# Patient Record
Sex: FEMALE | Race: WHITE | Employment: OTHER | URBAN - METROPOLITAN AREA
[De-identification: names, ages, dates, MRNs, and addresses within clinical notes are randomized per-mention and may not be internally consistent; named-entity substitution may affect disease eponyms.]

---

## 2023-10-12 RX ORDER — HYDROXYZINE HYDROCHLORIDE 10 MG/1
2 TABLET, FILM COATED ORAL EVERY 6 HOURS PRN
COMMUNITY
Start: 2023-08-19

## 2023-10-12 RX ORDER — GABAPENTIN 100 MG/1
100 CAPSULE ORAL NIGHTLY
COMMUNITY
Start: 2023-10-04

## 2023-10-12 RX ORDER — ATORVASTATIN CALCIUM 80 MG/1
80 TABLET, FILM COATED ORAL NIGHTLY
COMMUNITY
Start: 2023-09-29

## 2023-10-12 RX ORDER — LISINOPRIL 10 MG/1
10 TABLET ORAL DAILY
COMMUNITY
Start: 2023-09-29

## 2023-10-12 RX ORDER — MIRABEGRON 25 MG/1
25 TABLET, FILM COATED, EXTENDED RELEASE ORAL DAILY
COMMUNITY
Start: 2022-11-30 | End: 2023-10-18 | Stop reason: ALTCHOICE

## 2023-10-12 RX ORDER — ESCITALOPRAM OXALATE 10 MG/1
10 TABLET ORAL DAILY
COMMUNITY
Start: 2023-08-13

## 2023-10-12 RX ORDER — LOSARTAN POTASSIUM 100 MG/1
100 TABLET ORAL DAILY
COMMUNITY
Start: 2022-12-27 | End: 2023-10-18 | Stop reason: ALTCHOICE

## 2023-10-12 RX ORDER — PANTOPRAZOLE SODIUM 40 MG/1
40 TABLET, DELAYED RELEASE ORAL DAILY
COMMUNITY
Start: 2023-09-29

## 2023-10-12 RX ORDER — CARVEDILOL 3.12 MG/1
3.12 TABLET ORAL
COMMUNITY
Start: 2022-12-27 | End: 2023-10-18 | Stop reason: ALTCHOICE

## 2023-10-12 RX ORDER — CHLORTHALIDONE 25 MG/1
25 TABLET ORAL DAILY
COMMUNITY
Start: 2023-01-16 | End: 2023-10-18 | Stop reason: ALTCHOICE

## 2023-10-12 RX ORDER — BUPROPION HYDROCHLORIDE 300 MG/1
300 TABLET ORAL
COMMUNITY
Start: 2023-08-11

## 2023-10-12 RX ORDER — TACROLIMUS 1 MG/G
OINTMENT TOPICAL 2 TIMES DAILY
COMMUNITY
Start: 2022-10-31 | End: 2023-10-18 | Stop reason: ALTCHOICE

## 2023-10-12 RX ORDER — BUPROPION HYDROCHLORIDE 150 MG/1
150 TABLET ORAL
COMMUNITY
Start: 2023-01-16 | End: 2023-10-18 | Stop reason: ALTCHOICE

## 2023-10-13 ENCOUNTER — OFFICE VISIT (OUTPATIENT)
Dept: ORTHOPEDIC SURGERY | Facility: CLINIC | Age: 83
End: 2023-10-13
Payer: MEDICARE

## 2023-10-13 VITALS — WEIGHT: 129 LBS | HEIGHT: 59 IN | BODY MASS INDEX: 26 KG/M2

## 2023-10-13 DIAGNOSIS — G95.20 CERVICAL SPINAL CORD COMPRESSION (MULTI): ICD-10-CM

## 2023-10-13 DIAGNOSIS — M48.02 CERVICAL SPINAL STENOSIS: ICD-10-CM

## 2023-10-13 DIAGNOSIS — G95.9 CERVICAL MYELOPATHY WITH CERVICAL RADICULOPATHY (MULTI): Primary | ICD-10-CM

## 2023-10-13 DIAGNOSIS — M54.12 CERVICAL MYELOPATHY WITH CERVICAL RADICULOPATHY (MULTI): Primary | ICD-10-CM

## 2023-10-13 PROCEDURE — 1036F TOBACCO NON-USER: CPT | Performed by: ORTHOPAEDIC SURGERY

## 2023-10-13 PROCEDURE — 99203 OFFICE O/P NEW LOW 30 MIN: CPT | Performed by: ORTHOPAEDIC SURGERY

## 2023-10-13 NOTE — PROGRESS NOTES
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  She comes in today with difficulty with walking and balance which preceded her stroke.  She denies any upper extremity symptoms.  She is currently on aspirin as a preventative for her stroke.  She is using a walker for ambulation.      ROS:  Reviewed on EMR and patient intake sheet.    PMH/SH:   Reviewed on EMR and patient intake sheet.    Exam:  Physical Exam    Constitutional: Well appearing; no acute distress  Eyes: pupils are equal and round  Psych: normal affect  Respiratory: non-labored breathing  Cardiovascular: regular rate and rhythm  GI: non-distended abdomen  Musculoskeletal: no pain with range of motion of the shoulders bilaterally; no signs of impingement  Neurologic: [4-]/5 strength in the upper extremities bilaterally]; [negative Marcos's]; [no hyper-reflexia]; negative Spurling's    Radiology:  MRI was personally reviewed.  It demonstrates severe multilevel cervical stenosis and spinal cord compression from C2-C7.  Evidence of slight C1-2 pannus consistent with rheumatoid disease.    Diagnosis:  Cervical spondylotic myelopathy; cervical stenosis; cervical spinal cord compression    Assessment and Plan:   83-year-old woman with symptomatic cervical myelopathy secondary to multilevel cervical stenosis.  She has had a superimposed left hemiplegia from her recent stroke.  Although her myelopathy will be progressive if she does not have surgical intervention, at this time she is recovering from her recent stroke, surgical intervention would be too high risk from an anesthetic standpoint.  I recommend that she continue to rehab from her stroke at this time.  However, should she have progressive myelopathy, and should she be cleared for surgical intervention, then surgery would be the best way to prevent further worsening of her underlying neurologic status.   She will continue rehab and follow-up with me, based on progression of symptoms.    The patient was in agreement with the plan. At the end of the visit today, the patient felt that all questions had been answered satisfactorily.  The patient was pleased with the visit and very appreciative for the care rendered.     Thank you very much for the kind referral.  It is a privilege, and a pleasure, to partner with you in the care of your patients.  I would be delighted to assist you with any further consultations as needed.  Please feel free to have your patients refer to my website, [email or web address], for patient education materials regarding treatment options for common spinal conditions.        Sudheer Hawkins MD    Chief of Spine Surgery, [facility]  Director of Spine Service, [facility]  , Department of Orthopaedics  [facility] [employer]  [address] [place] [address]  [email or web address]  P: [phone]    This note was dictated with voice recognition software.  It has not been proofread for grammatical errors, typographical mistakes or other semantic inconsistencies.

## 2023-10-18 ENCOUNTER — OFFICE VISIT (OUTPATIENT)
Dept: NEUROLOGY | Facility: HOSPITAL | Age: 83
End: 2023-10-18
Payer: MEDICARE

## 2023-10-18 VITALS
WEIGHT: 141.5 LBS | HEIGHT: 59 IN | HEART RATE: 69 BPM | DIASTOLIC BLOOD PRESSURE: 65 MMHG | BODY MASS INDEX: 28.52 KG/M2 | SYSTOLIC BLOOD PRESSURE: 116 MMHG

## 2023-10-18 DIAGNOSIS — I63.511 CEREBROVASCULAR ACCIDENT (CVA) DUE TO STENOSIS OF RIGHT MIDDLE CEREBRAL ARTERY (MULTI): Primary | ICD-10-CM

## 2023-10-18 DIAGNOSIS — I67.9 CEREBROVASCULAR DISEASE: ICD-10-CM

## 2023-10-18 PROBLEM — M25.431 SWELLING OF JOINT OF RIGHT WRIST: Status: ACTIVE | Noted: 2022-04-03

## 2023-10-18 PROBLEM — R06.02 SHORTNESS OF BREATH: Status: ACTIVE | Noted: 2022-10-05

## 2023-10-18 PROBLEM — M79.605 BILATERAL LEG PAIN: Status: ACTIVE | Noted: 2019-11-19

## 2023-10-18 PROBLEM — M54.2 CERVICALGIA: Status: ACTIVE | Noted: 2023-10-18

## 2023-10-18 PROBLEM — R55 SYNCOPE AND COLLAPSE: Status: ACTIVE | Noted: 2022-10-02

## 2023-10-18 PROBLEM — M53.3 DISORDER OF SACRUM: Status: ACTIVE | Noted: 2023-10-18

## 2023-10-18 PROBLEM — R43.2 ALTERED TASTE: Status: ACTIVE | Noted: 2019-11-19

## 2023-10-18 PROBLEM — F51.01 PRIMARY INSOMNIA: Status: ACTIVE | Noted: 2023-09-08

## 2023-10-18 PROBLEM — R68.2 DRY MOUTH: Status: ACTIVE | Noted: 2022-04-03

## 2023-10-18 PROBLEM — N18.4 STAGE 4 CHRONIC KIDNEY DISEASE (MULTI): Status: ACTIVE | Noted: 2020-09-23

## 2023-10-18 PROBLEM — G89.29 CHRONIC PAIN: Status: ACTIVE | Noted: 2023-10-18

## 2023-10-18 PROBLEM — N28.89 MASS OF KIDNEY OF UNKNOWN NATURE: Status: ACTIVE | Noted: 2019-11-10

## 2023-10-18 PROBLEM — R09.82 POST-NASAL DRAINAGE: Status: ACTIVE | Noted: 2022-04-03

## 2023-10-18 PROBLEM — M79.604 BILATERAL LEG PAIN: Status: ACTIVE | Noted: 2019-11-19

## 2023-10-18 PROBLEM — F33.1 MODERATE EPISODE OF RECURRENT MAJOR DEPRESSIVE DISORDER (MULTI): Status: ACTIVE | Noted: 2019-02-18

## 2023-10-18 PROBLEM — G47.33 OSA (OBSTRUCTIVE SLEEP APNEA): Chronic | Status: ACTIVE | Noted: 2018-07-20

## 2023-10-18 PROBLEM — R00.1 BRADYCARDIA: Status: ACTIVE | Noted: 2022-10-01

## 2023-10-18 PROBLEM — M54.50 LOW BACK PAIN: Status: ACTIVE | Noted: 2022-04-03

## 2023-10-18 PROBLEM — R11.10 REGURGITATION OF FOOD: Status: ACTIVE | Noted: 2022-12-20

## 2023-10-18 PROBLEM — K21.9 GASTROESOPHAGEAL REFLUX DISEASE: Status: ACTIVE | Noted: 2022-12-20

## 2023-10-18 PROBLEM — E83.42 HYPOMAGNESEMIA: Status: ACTIVE | Noted: 2022-10-02

## 2023-10-18 PROBLEM — N17.9 AKI (ACUTE KIDNEY INJURY) (CMS-HCC): Status: ACTIVE | Noted: 2019-11-18

## 2023-10-18 PROBLEM — N39.41 URGE INCONTINENCE OF URINE: Status: ACTIVE | Noted: 2021-09-28

## 2023-10-18 PROCEDURE — 3074F SYST BP LT 130 MM HG: CPT | Performed by: NURSE PRACTITIONER

## 2023-10-18 PROCEDURE — 1159F MED LIST DOCD IN RCRD: CPT | Performed by: NURSE PRACTITIONER

## 2023-10-18 PROCEDURE — 99417 PROLNG OP E/M EACH 15 MIN: CPT | Performed by: NURSE PRACTITIONER

## 2023-10-18 PROCEDURE — 1036F TOBACCO NON-USER: CPT | Performed by: NURSE PRACTITIONER

## 2023-10-18 PROCEDURE — 99215 OFFICE O/P EST HI 40 MIN: CPT | Performed by: NURSE PRACTITIONER

## 2023-10-18 PROCEDURE — 1160F RVW MEDS BY RX/DR IN RCRD: CPT | Performed by: NURSE PRACTITIONER

## 2023-10-18 PROCEDURE — 3078F DIAST BP <80 MM HG: CPT | Performed by: NURSE PRACTITIONER

## 2023-10-18 RX ORDER — METHOCARBAMOL 750 MG/1
750 TABLET, FILM COATED ORAL
COMMUNITY
Start: 2019-10-07 | End: 2023-10-18 | Stop reason: ALTCHOICE

## 2023-10-18 RX ORDER — PNV NO.95/FERROUS FUM/FOLIC AC 28MG-0.8MG
100 TABLET ORAL
COMMUNITY

## 2023-10-18 RX ORDER — MAGNESIUM CHLORIDE 64 MG
64 TABLET, DELAYED RELEASE (ENTERIC COATED) ORAL
COMMUNITY
Start: 2019-11-11 | End: 2023-10-18 | Stop reason: ALTCHOICE

## 2023-10-18 RX ORDER — ALENDRONATE SODIUM 70 MG/1
1 TABLET ORAL
COMMUNITY
Start: 2022-06-24

## 2023-10-18 RX ORDER — AMOXICILLIN 250 MG
2 CAPSULE ORAL DAILY
COMMUNITY

## 2023-10-18 RX ORDER — ASPIRIN 81 MG/1
81 TABLET ORAL DAILY
COMMUNITY

## 2023-10-18 RX ORDER — ACETAMINOPHEN 500 MG
1000 TABLET ORAL
COMMUNITY
Start: 2020-11-25

## 2023-10-18 RX ORDER — OXYCODONE AND ACETAMINOPHEN 5; 325 MG/1; MG/1
1 TABLET ORAL EVERY 6 HOURS PRN
COMMUNITY

## 2023-10-18 RX ORDER — LORATADINE 10 MG/1
1 TABLET ORAL
COMMUNITY
Start: 2017-06-26

## 2023-10-18 RX ORDER — VIT C/E/ZN/COPPR/LUTEIN/ZEAXAN 250MG-90MG
5000 CAPSULE ORAL
COMMUNITY
End: 2023-10-18 | Stop reason: ALTCHOICE

## 2023-10-18 RX ORDER — AMLODIPINE BESYLATE 10 MG/1
TABLET ORAL EVERY 24 HOURS
COMMUNITY
End: 2023-10-18 | Stop reason: ALTCHOICE

## 2023-10-18 ASSESSMENT — ENCOUNTER SYMPTOMS
OCCASIONAL FEELINGS OF UNSTEADINESS: 1
DEPRESSION: 1
LOSS OF SENSATION IN FEET: 0

## 2023-10-18 ASSESSMENT — PATIENT HEALTH QUESTIONNAIRE - PHQ9
SUM OF ALL RESPONSES TO PHQ9 QUESTIONS 1 AND 2: 3
SUM OF ALL RESPONSES TO PHQ QUESTIONS 1-9: 9
10. IF YOU CHECKED OFF ANY PROBLEMS, HOW DIFFICULT HAVE THESE PROBLEMS MADE IT FOR YOU TO DO YOUR WORK, TAKE CARE OF THINGS AT HOME, OR GET ALONG WITH OTHER PEOPLE: VERY DIFFICULT
1. LITTLE INTEREST OR PLEASURE IN DOING THINGS: NOT AT ALL
7. TROUBLE CONCENTRATING ON THINGS, SUCH AS READING THE NEWSPAPER OR WATCHING TELEVISION: NOT AT ALL
9. THOUGHTS THAT YOU WOULD BE BETTER OFF DEAD, OR OF HURTING YOURSELF: NOT AT ALL
2. FEELING DOWN, DEPRESSED OR HOPELESS: NEARLY EVERY DAY
3. TROUBLE FALLING OR STAYING ASLEEP OR SLEEPING TOO MUCH: SEVERAL DAYS
8. MOVING OR SPEAKING SO SLOWLY THAT OTHER PEOPLE COULD HAVE NOTICED. OR THE OPPOSITE, BEING SO FIGETY OR RESTLESS THAT YOU HAVE BEEN MOVING AROUND A LOT MORE THAN USUAL: NOT AT ALL
4. FEELING TIRED OR HAVING LITTLE ENERGY: SEVERAL DAYS
5. POOR APPETITE OR OVEREATING: SEVERAL DAYS
6. FEELING BAD ABOUT YOURSELF - OR THAT YOU ARE A FAILURE OR HAVE LET YOURSELF OR YOUR FAMILY DOWN: NEARLY EVERY DAY

## 2023-10-18 ASSESSMENT — VISUAL ACUITY: VA_NORMAL: 1

## 2023-10-18 NOTE — PATIENT INSTRUCTIONS
"1. Continue your daily Asprin and statin medication.   Asprin is not a blood thinner but rather a platelet inhibitor medication. Platelets are the component in your blood that causes it to clot. When a person has a stroke, platelets clump together and form clots inside of narrowed arteries, blocking blood flow in the brain. Antiplatelet therapy helps prevent new clots from developing and is beneficial for the treatment of acute ischemic stroke, they do NOT dissolve clots that are already present    2. You have had a stroke. Strokes can happen when either: an artery going to the brain gets clogged or closes off, and part of the brain goes without blood for too long (ischemic stroke); or, an artery breaks open and starts bleeding into or around the brain (hemorrhagic stroke). During recovery, people work to regain some of the abilities they lost. Even though a part of their brain was damaged by the stroke, their brain might be able re-learn how to do some or all of the things it used to do. In general, the majority of a person's recovery happens in the first three to six months after the stroke; after this, improvements in physical and mental function can still happen, but progress tends to slow down. After about 1.5-2 years, what symptoms that are left over are likely your new normal.    TIA stands for \"transient ischemic attack.\" This is what people call a mini-stroke. It is like a stroke, but does not cause permanent damage to the brain. TIAs happen when an artery in the brain gets clogged or closes off, then reopens on its own. This can happen if a blood clot forms and then moves away or dissolves.Even though TIAs do not cause lasting symptoms, they are serious. If you have a TIA, you are at high risk of having a stroke. It's important to see a doctor and take steps to prevent that from happening. Do not ignore the symptoms of a stroke even if they go away!    3.The Acronym \"BE FAST\" can help you remember stroke " symptoms to watch out for:  Balance - Is the person having trouble standing or walking?  Eyes - Is the person having trouble with their vision?  Face - Does the person's face look uneven or droop on 1 side?  Arm - Does the person have weakness or numbness in 1 or both arms? Does 1 arm drift down if they try to hold both arms out?  Speech - Is the person having trouble speaking? Does their speech sound strange?  Time - If you notice any of these stroke signs, call for an ambulance (call [id number]). You need to act FAST. The sooner treatment begins, the better the chances of recovery.    A stroke caused by bleeding in the brain can also cause a sudden, severe headache.    4. It is important to control your stroke risk factors. These risk factors can increase the risk of stroke but strokes can still happen in people who do not know they are at risk or do not have risk factors.     Ischemic stroke risk factors include the following: age older than 40 years, heart disease, high blood pressure, smoking, diabetes, high cholesterol, illegal drug use, recent childbirth, previous TIA history, lack of exercise, obesity, current or past history of blood clots, and family history of heart disease and/or stroke.     Hemorrhagic stroke risk factors include the following: high blood pressure, smoking, illegal drug use, and use of blood thinners.       FU with your eye doctor as planned.  Keep doing your therapies.     FU here in 6 months or sooner if needed.

## 2023-10-18 NOTE — PROGRESS NOTES
SUBJECTIVE    Myrna Francis is a 83 y.o. right-handed female who presents with   Chief Complaint   Patient presents with    Stroke    Patient is accompanied by: spouse.    Presents for follow up visit  Visit type: In-Person     HISTORY OF PRESENT ILLNESS    RECAP:  Hospital follow up for stroke. Pt was admitted to [facility] 9/13-9/18/23. Seen by Dr. Mckeon and myself inpatient.     PMH: HTN and MCI    Presented for AMS and facial weakness. LKW was 9/12/23 evening prior to going to bed. In the morning, her  found her struggling to get out of the bed. She had left side facial weakness and trouble speaking.  Patient has chronic weakness of her left upper extremity secondary to shoulder joint problem.  Patient had fallen about 10 days prior and was complaining of L rib pain.     HCT without acute findings.   Total cholesterol 203, , triglyceride 168  A1C 5.8%  TTE: EF normal, LA mild to moderately dilated.   MRI brain showed acute to early subacute ischemic injury in R MCA distribution. Also showed extensive small vessel disease and likely remote lacunar infarcts.   MRA head and neck showed marked irregularity and narrowing of R MCA as well as narrowing of bilateral ACAs, L MCA and bilateral PCAs suggestive of multifocal stenoses and vasculopathy. R V4 segment also with narrowing and artifact vs nonocclusive filling defect vs thrombus at the vertebrobasilar junction. Additionally, it showed 2 mm outpouchings projecting inferiorly from the communicating segments of the ICAs. Bilateral proximal ICAs and carotid bifurcations with mild stenosis.   MRI c-spine with moderate spinal canal stenosis and some effacement, mainly level C3-C4     Started on DAPT and signed off.     Neurology was then reconsulted for anisocoria/sluggish R pupil.  CT head suggests new lacunar stroke in thalamus. Pt and  denied any changes to symptoms. On routine neuro check, nurse noted anisocoria with L pupil 6 mm and R pupil 4  mm, prompting obtaining stat CT scan. There was no acute thalamic stroke on repeat MRI.  CTA head then done to further evaluate MRA findings, showed R M1 and L P2 stenosis, no basilar artery thrombus seen on CTA; bilateral infundibulum of PCom with no aneurysm; no significant extracranial stenosis of MELVA, LICA.     10/18/23 -     She went to [facility] x 10 days. States they were very happy with the care that they received there. Has since been getting home PT but is going back to the outpatient PT next week because they feel the other equipment was helpful. Is currently ambulating with walker. States balance and mobility is improving slowly.     SLP signed off while in the hospital. Pt and  deny any further speech difficulties.      states facial symmetry is at baseline level.   Only complaint is difficulty walking, LLE weakness issues and neck pain/spasms. Has since seen Dr. Hawkins with spinal surgery, states pt is not surgical candidate. Pt and  agree with this and do not think risk of surgery is worth it.     States she is concerned vision is worse. Has upcoming eye doctor appointment.  states she was complaining of this prior to stroke.       REVIEW OF SYSTEMS:  10 point review of systems performed and is negative except as noted in the HPI.     Past Medical History:   Diagnosis Date    Hypertension     Mild cognitive impairment        No relevant family history has been documented for this patient.    Past Surgical History:   Procedure Laterality Date    MR HEAD ANGIO WO IV CONTRAST  9/13/2023    MR HEAD ANGIO WO IV CONTRAST 9/13/2023 P[facility] MRI    MR NECK ANGIO WO IV CONTRAST  9/13/2023    MR NECK ANGIO WO IV CONTRAST 9/13/2023 [facility] MRI       Social History     Substance and Sexual Activity   Drug Use Never     Tobacco Use: Low Risk  (10/18/2023)    Patient History     Smoking Tobacco Use: Never     Smokeless Tobacco Use: Never     Passive Exposure: Never     Alcohol Use:  Not on file        OBJECTIVE    Physical Exam  Eyes:      General: Lids are normal.      Extraocular Movements: Extraocular movements intact.      Pupils: Pupils are equal, round, and reactive to light.   Neurological:      Deep Tendon Reflexes:      Reflex Scores:       Tricep reflexes are 1+ on the right side and 1+ on the left side.       Bicep reflexes are 1+ on the right side and 1+ on the left side.       Brachioradialis reflexes are 1+ on the right side and 1+ on the left side.       Patellar reflexes are 0 on the left side.       Achilles reflexes are 0 on the right side and 0 on the left side.  Psychiatric:         Speech: Speech normal.       Neurological Exam  Mental Status  Awake, alert and oriented to person, place and time. Oriented to person, place and time. Recent and remote memory are intact. Speech is normal. Language is fluent with no aphasia. Attention and concentration are normal.    Cranial Nerves  CN II: Tested with correction. Visual acuity is normal. Visual fields full to confrontation. Right funduscopic exam: not visualized. Left funduscopic exam: not visualized.  CN III, IV, VI: Extraocular movements intact bilaterally. Normal lids and orbits bilaterally. Pupils equal round and reactive to light bilaterally.  CN V: Facial sensation is normal.  CN VII:  Right: There is no facial weakness.  Left: There is peripheral facial weakness. Mild, this is baseline per .  CN VIII: Hearing is normal.  CN IX, X: Palate elevates symmetrically  CN XI: Shoulder shrug strength is normal.  CN XII: Tongue midline without atrophy or fasciculations.    Motor  Normal muscle bulk throughout. No fasciculations present. Normal muscle tone. No abnormal involuntary movements. Strength is 5/5 in all four extremities except as noted.  5-/5 to LLE  Strength intact to RUE and RLE, LUE limited due to prior shoulder surgery so proximal ROM limited but distal strength intact. .    Sensory  Light touch is normal in  upper and lower extremities. Vibration is normal in upper and lower extremities.     Reflexes                                            Right                      Left  Brachioradialis                    1+                         1+  Biceps                                 1+                         1+  Triceps                                1+                         1+  Patellar                                Tr                         0  Achilles                                0                         0    Coordination  Right: Finger-to-nose normal.Left: Finger-to-nose normal.    Gait  Casual gait: Wide stance. Reduced stride length. Hesitant gait.  With walker.          CT angio head neck w and wo IV contrast 09/17/2023    Narrative  Interpreted By:  NADIA LANGFORD MD  MRN: [id number]  Patient Name: BELLA MANUEL    STUDY:  CT ANGIO HEAD NECK W AND WO IV CONTRAST;  9/17/2023 1:01 am    INDICATION:  S/p R MCA stroke; decreased visualization of R MCA branches; aneurysm  vs infundibulum MELVA and LICA; possible thrombus basilar artery.  Further evaluate intracranial and extracranial circulation. .    COMPARISON:  MRI of the brain dated 09/16/2023; MRI of the brain and MRA of the  head and neck dated 09/13/2023; MRI of the cervical spine dated  09/13/2023    ACCESSION NUMBER(S):  [id number]    ORDERING CLINICIAN:  CAREN PRITCHARD    TECHNIQUE:  Unenhanced CT images of the head were obtained. Subsequently,  50 mL  of Omnipaque 350 was administered intravenously and axial images of  the head and neck were acquired.  Coronal, sagittal, and 3-D  reconstructions were provided for review.    FINDINGS:  Noncontrast CT images of the head do not demonstrate any evidence of  hyperdense intracranial hemorrhage. No mass effect or midline shift  is present.    Small area of diminished attenuation is present in the right frontal  lobe, corresponding to area of diffusion restriction seen on previous  exam dated 09/16/2023.  There is no significant local mass effect or  evidence of hemorrhagic transformation.    No new areas of CT apparent transcortical infarct are identified.  Small focus of diminished attenuation in the left thalamus likely  represents sequela of prior lacunar infarct. Patchy and confluent  areas of diminished attenuation are again present in the  periventricular and subcortical white matter of bilateral cerebral  hemispheres, likely representing sequela of microvascular disease.    Mild diffuse parenchymal volume loss is present without abnormal  ventricular dilatation. Basal cisterns are patent. No abnormal  extra-axial fluid collections are present.    Scalp soft tissues do not demonstrate any acute abnormalities.  Calvarium is unremarkable in appearance. Mastoid air cells and middle  ear cavities are unremarkable appearance, without evidence of fluid  fluid levels.    Visualized paranasal sinuses do not demonstrate any acute  abnormalities.    CTA HEAD FINDINGS:    After administration of intravenous contrast, the intracranial  carotid arteries demonstrate symmetric opacification bilaterally,  with scattered areas of calcified plaque present in the cavernous,  ophthalmic and clinoid segments, but no evidence of focal vessel  occlusion or high-grade stenosis.    Carotid terminals demonstrate symmetric opacification bilaterally. A  2 mm inferior oriented outpouching arising from the clinoid segment  of the left internal carotid artery is a small vessel infundibulum.    Anterior cerebral arteries demonstrate symmetric opacification  bilaterally without evidence of focal vessel occlusion.    The right middle cerebral artery is somewhat irregular in appearance  proximally, demonstrate areas of mild-to-moderate narrowing in the  mid and distal M1 segment. No focal occlusion is identified in the  more distal M2 and M3 branches.    No significant stenosis is present in the M1 segment of the left  middle cerebral artery or  its more distal M2 and M3 branches.    Intracranial vertebral arteries demonstrate areas of scattered  calcified plaque without evidence of focal occlusion. No significant  stenosis or occlusion is present in the basilar artery.    Mild-to-moderate narrowing is present in the mid to distal P2 segment  of the left posterior cerebral artery without evidence of focal  vessel occlusion present in the posterior cerebral arteries  bilaterally.    No vascular malformations are identified.    CTA NECK FINDINGS:    Evaluation of the upper chest and lower neck is somewhat degraded by  beam hardening artifact from the contrast bolus in the left  subclavian vein in the upper SVC.    Within limitations of the study, a three-vessel aortic arch is  present, with mild atherosclerotic changes present at the arch in  origin of the great vessels.    The visualized segments of the common carotid arteries bilaterally do  not demonstrate any evidence of focal occlusion proximally. Mild  narrowing is noted in the distal most segment of the right common  carotid artery due to calcified and noncalcified atherosclerotic  plaque.    Calcified and noncalcified atherosclerotic plaque is present at the  carotid bifurcations bilaterally, with less than 10% stenosis by  NASCET criteria present.    More distally the extracranial internal carotid arteries are tortuous  in course, right-greater-than-left, without evidence of significant  stenosis or occlusion.    Evaluation of the extracranial vertebral arteries is somewhat  degraded by beam hardening artifact from patient's dental hardware,  however within limitations of the study, no significant vessel  occlusion or stenosis is present bilaterally. The distal most V2 and  V3 segments demonstrate markedly tortuous course bilaterally.    Soft tissues of the face and skull base do not demonstrate any acute  abnormalities. No enlarged lymphadenopathy is present. Thyroid is  unremarkable in  appearance.    Included lung apices are clear.    Multilevel degenerative changes of the cervical spine are again  demonstrated, worst at C3-C4 due to posterior osteophytic spurring,  bulging disc and spondylolisthesis. Please refer to previously  dictated MRI of the cervical spine for further characterization of  cervical findings.    Impression  1. No large vessel occlusion is identified in the proximal  intracranial circulation.  2. Irregular contour with luminal narrowing of the distal M1 segment  of the right middle cerebral artery, with likely at least  mild-to-moderate stenosis but preserved opacification of the vessels  distally. Additional at least mild-to-moderate stenosis is present in  the distal P2 segment of the left posterior cerebral artery, with  preserved opacification of the vessels distally.  3. Small inferior oriented saccular outpouching changed arising from  the posterior communicating arteries described on previous MRI is  correspond to small vessel infundibulum. No saccular aneurysms or  other vascular abnormality is definitely identified intracranially.  4. Calcified and noncalcified atherosclerotic plaque is present in  the extracranial internal carotid arteries near the carotid  bifurcations bilaterally, although there is less than 10% stenosis by  NASCET criteria.  5. Extracranial vertebral arteries are tortuous in course in the  distal extracranial segments, but do not demonstrate any significant  stenosis or large vessel occlusion.    MACRO:  None    MR brain wo IV contrast    Addendum Date: 9/16/2023    Interpreted By:  LESTER BOSE MD, PHD Addendum Begins MRN: [id number] Patient Name: BELLA MANUEL  ADDENDUM: Additional axial T2 and T2 gradient echo and coronal T1 weighted images were sent to PACS after the original dictation was completed. On gradient echo images, hemosiderin deposition associated with a chronic infarct in the right basal ganglia is unchanged from previous, no  other intracranial hemorrhage is identified. The findings are otherwise unchanged, the impression is updated as follows:  Motion limited exam with expected evolution of the subacute right MCA territory infarction, no new infarction or evidence of hemorrhagic transformation. Addendum Ends    Result Date: 9/16/2023  MRN: [id number] Patient Name: BELLA MANUEL  STUDY: MRI BRAIN WO;  9/16/2023 3:39 pm  INDICATION: Newly sluggish R pupil, acute lacunar infarct of thalamus seen on CTH on 9/16 .  COMPARISON: Head CT, 09/16/2023 and brain MRI, 09/13/2020  ACCESSION NUMBER(S): [id number]  ORDERING CLINICIAN: KONRAD GARLAND  TECHNIQUE: Axial T2, FLAIR, and diffusion-weighted and sagittal T1 weighted images of the brain were acquired.  Image quality is degraded by motion, however the patient was unable to tolerate further imaging and the study was terminated.  FINDINGS: Restricted diffusion in the cortex of the right frontal lobe and insula as well as the adjacent white matter is similar to previous given the differences in technique. Associated T2 hyperintense signal is similar to the recent head CT but increased from the prior MRI. No other diffusion signal abnormality. No gradient echo images were acquired, however there is no evidence of hematoma formation. Minimal sulcal effacement is unchanged from the recent head CT but new from the prior MRI. No midline shift or herniation. The basal cisterns are patent.  Generalized parenchymal volume loss with associated prominence of the ventricles and sulci, unchanged. Numerous chronic lacunar infarcts in the bilateral basal ganglia and left thalamus are unchanged. Patchy T2 hyperintense signal in the cerebral white matter and brainstem is similar to previous and likely secondary to chronic small vessel ischemic disease. No abnormal extra-axial collection. Limited evaluation of the paranasal sinuses and mastoid air cells is unremarkable.      1. Motion limited exam, the patient was  unable to tolerate further imaging and the study was terminated. 2. Expected evolution of the subacute right MCA territory infarction, no new infarction.  MACRO: None    MR brain wo IV contrast    Result Date: 9/13/2023  Interpreted By:  CHARLY ROSARIO DO and DARRYL PETERSON MD MRN: [id number] Patient Name: BELLA MANUEL  STUDY: MRI BRAIN WO; MRA HEAD W/O C; MRA NECK WO.C;  9/13/2023 11:09 am; 9/13/2023 11:08 am  INDICATION: cva .  Stroke protocol.  COMPARISON: Same day head CT  ACCESSION NUMBER(S): [id number]; [id number]; [id number]  ORDERING CLINICIAN: SELENA SOLANO  TECHNIQUE: Axial T2, FLAIR, DWI, gradient echo T2 and  sagittal and coronal T1 weighted images of brain were acquired.  Time-of-flight MRA of the head  and neck was performed. The images were reviewed as source images and maximum intensity projections.  FINDINGS: The examination is significantly degraded due to motion artifact.  Brain:  CSF Spaces: There is prominence of ventricles and sulci compatible with parenchymal volume loss.  Parenchyma: There is abnormal increased signal on diffusion-weighted imaging with corresponding diminished signal on the ADC map within the right frontal lobe with additional punctate focus in the right insular cortex. Additional severe patchy and confluence nonspecific T2 hyperintense signal in the cerebral white matter bilaterally as well as the brainstem and the right cerebellum, likely secondary to chronic small vessel ischemic disease in this age demographic. Remote lacunar infarcts are suspected within bilateral basal ganglia and thalami. There is no mass effect or midline shift.  Note is made of a partially empty sella, a nonspecific finding which may be incidental or can be seen in the setting of intracranial hypertension.  Paranasal Sinuses and Mastoids: Visualized paranasal sinuses and mastoid air cells are unremarkable.  MRA of head: The images are degraded by motion artifact.  Anterior circulation: There is  narrowing of the supraclinoid segments of the internal carotid arteries bilaterally. There is a focal outpouching projecting inferiorly from the communicating segment on the left measuring up to approximately 2 mm. There is also focal outpouching on the right projecting inferomedially measuring at most 2 mm. There is multifocal irregularity and narrowing of bilateral ACAs. Additionally, there is marked irregularity and narrowing of the right MCA including the M1 and proximal M2 branches. There are additional areas of irregularity and narrowing of distal MCA branches bilaterally.  Posterior circulation:  The left vertebral artery is dominant. There is attenuation of flow related signal along the V4 segment on the right with areas of irregularity and narrowing extending to the vertebrobasilar junction. There is nonocclusive filling defect at the vertebrobasilar junction. The basilar artery is otherwise patent. There is multifocal irregularity and narrowing of bilateral PCAs.  MRA of neck:  The source images are degraded by artifact and patient motion.  Carotid vessels: There is a short segment of potential narrowing of the proximal 3rd of the left common carotid artery. Otherwise, the included portions of the common carotid arteries are patent. There is irregularity and attenuation of flow related signal at the carotid bifurcations and along the proximal ICAs bilaterally with at most 30% narrowing of the proximal right ICA relative to the caliber of the vessel more distally and little to no measurable stenosis on the left.  Vertebral vessels: The proximal vertebral arteries are markedly degraded by artifact. Otherwise, within the limitations of this motion degraded examination the cervical segments are grossly patent.      Significant motion degraded examination.  MRI Brain:  1. Findings compatible with acute to early subacute ischemic injury in the right MCA distribution. 2. Extensive white matter changes are  nonspecific but most likely represent small-vessel ischemic disease in a patient of this age and also involve the lj. Several remote lacunar infarcts are also suspected..   MRA:  1.  Marked irregularity and narrowing of the right MCA. There is also irregularity and narrowing of bilateral ACAs, left MCA, and bilateral PCAs suggestive of multifocal stenoses and vasculopathy. There is also marked attenuation of flow related signal along the V4 segment of the right vertebral artery extending to the vertebrobasilar junction likely due to a combination of developmental variation with superimposed diminished flow and/or stenosis. There is either artifact versus nonocclusive filling defect at the vertebrobasilar junction/proximal basilar artery and a tiny amount of thrombus is not excluded. 2. 2 mm outpouchings projecting inferiorly from the communicating segments of the internal carotid arteries could be due to the origins of otherwise not well visualized small vessels or aneurysms. 3.  Atherosclerotic changes of the carotid bifurcations and proximal ICAs with mild stenosis.  Critical findings were relayed by the radiology resident Dr. Loya to Elisabeth Davis MD at 11:16 a.m. on 09/13/2023 with confirmation received.  I personally reviewed the images/study and I agree with the findings as stated.  MACRO: None            ASSESSMENT & PLAN  Problem List Items Addressed This Visit          Neuro    Cerebrovascular accident (CVA) due to stenosis of right middle cerebral artery (CMS/HCC) - Primary    Overview     September 2023  Sx of L facial droop and dysarthria  Elevated lipids, hx of HTN at the time.   TTE: EF normal, LA mild to moderately dilated.   MRI brain showed acute to early subacute ischemic injury in R MCA distribution. Also showed extensive small vessel disease and likely remote lacunar infarcts.   CTA showed R M1 and L P2 stenosis, no basilar artery thrombus seen on CTA; bilateral infundibulum of PCom with no  aneurysm; no significant extracranial stenosis of MELVA, LICA.    Started on DAPT ASA and Plavix x 21 days then ASA 81 mg only daily  Started on HI statin         Current Assessment & Plan     Continue aspirin and statin daily.   Continue PT.   Manage vascular risks with PCP          Relevant Orders    Follow Up In Neurology    Cerebrovascular disease    Overview     MRI brain 9/2023 with extensive small vessel disease findings and likely remote lacunar infarcts.          Current Assessment & Plan     Control vascular risk factors with PCP including HTN and prediabetes.   Continue daily ASA.          Relevant Orders    Follow Up In Neurology         FU in 6 months